# Patient Record
Sex: FEMALE | Race: WHITE
[De-identification: names, ages, dates, MRNs, and addresses within clinical notes are randomized per-mention and may not be internally consistent; named-entity substitution may affect disease eponyms.]

---

## 2020-09-15 ENCOUNTER — HOSPITAL ENCOUNTER (EMERGENCY)
Dept: HOSPITAL 47 - EC | Age: 45
Discharge: HOME | End: 2020-09-15
Payer: COMMERCIAL

## 2020-09-15 VITALS — DIASTOLIC BLOOD PRESSURE: 101 MMHG | SYSTOLIC BLOOD PRESSURE: 136 MMHG | HEART RATE: 96 BPM

## 2020-09-15 VITALS — TEMPERATURE: 98.1 F | RESPIRATION RATE: 18 BRPM

## 2020-09-15 DIAGNOSIS — Z98.890: ICD-10-CM

## 2020-09-15 DIAGNOSIS — S82.831A: Primary | ICD-10-CM
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
## 2020-09-15 DIAGNOSIS — F17.200: ICD-10-CM

## 2020-09-15 PROCEDURE — 99283 EMERGENCY DEPT VISIT LOW MDM: CPT

## 2020-09-15 PROCEDURE — 29515 APPLICATION SHORT LEG SPLINT: CPT

## 2020-09-15 NOTE — ED
Lower Extremity Injury HPI





- General


Chief Complaint: Extremity Injury, Lower


Stated Complaint: Rt ankle injury


Time Seen by Provider: 09/15/20 13:58


Source: patient, RN notes reviewed, old records reviewed


Mode of arrival: wheelchair


Limitations: no limitations





- History of Present Illness


Initial Comments: 





Patient is a 44-year-old female who presents to the ER today for evaluation for 

right ankle pain.  She reports that she went to an urgent care today because 

Continued pain.  She states that she rolled her ankle 6 weeks ago and has been 

walking on it since then.  She reports that the x-rays at urgent care determine 

she has a fracture.  They sent her here for further evaluation and splinting and

casting.  Patient states that she has been able to walk and bear weight on her 

ankle.  She reports normal sensation to the foot by an occasional sharp pains 

with light touch over the lateral malleolus.  Patient denies any knee pain





- Related Data


                                    Allergies











Allergy/AdvReac Type Severity Reaction Status Date / Time


 


No Known Allergies Allergy   Verified 09/15/20 13:56














Review of Systems


ROS Statement: 


Those systems with pertinent positive or pertinent negative responses have been 

documented in the HPI.





ROS Other: All systems not noted in ROS Statement are negative.





Past Medical History


Past Medical History: No Reported History


History of Any Multi-Drug Resistant Organisms: None Reported


Past Surgical History: Orthopedic Surgery


Past Psychological History: No Psychological Hx Reported


Smoking Status: Current every day smoker


Past Alcohol Use History: Occasional


Past Drug Use History: None Reported





General Exam





- General Exam Comments


Initial Comments: 





44-year-old female.  Alert and oriented.  No distress.


Limitations: no limitations


General appearance: alert, in no apparent distress


Head exam: Present: atraumatic, normocephalic, normal inspection


Eye exam: Present: normal appearance, PERRL, EOMI.  Absent: scleral icterus, 

conjunctival injection, periorbital swelling


ENT exam: Present: normal exam, mucous membranes moist


Neck exam: Present: normal inspection.  Absent: tenderness, meningismus, 

lymphadenopathy


Respiratory exam: Present: normal lung sounds bilaterally.  Absent: respiratory 

distress, wheezes, rales, rhonchi, stridor


Cardiovascular Exam: Present: regular rate, normal rhythm, normal heart sounds. 

Absent: systolic murmur, diastolic murmur, rubs, gallop, clicks


GI/Abdominal exam: Present: soft, normal bowel sounds.  Absent: distended, 

tenderness, guarding, rebound, rigid


Extremities exam: Present: normal inspection, full ROM, normal capillary refill.

 Absent: tenderness, pedal edema, joint swelling, calf tenderness


  ** Right


Lower Leg exam: Present: normal inspection, full ROM


Ankle exam: Present: tenderness, swelling (swelling and tenderness over the 

lateral malleolus.  Dorsalis pedis pulses intact.).  Absent: normal inspection, 

full ROM


Foot/Toe exam: Present: normal inspection, full ROM


Back exam: Present: normal inspection, full ROM


Neurological exam: Present: alert, oriented X3, CN II-XII intact


Psychiatric exam: Present: normal affect, normal mood


Skin exam: Present: warm, dry, intact, normal color.  Absent: rash





Course


                                   Vital Signs











  09/15/20





  13:53


 


Temperature 98.1 F


 


Pulse Rate 94


 


Respiratory 18





Rate 


 


Blood Pressure 138/93


 


O2 Sat by Pulse 100





Oximetry 














Procedures





- Orthopedic Splinting/Casting


  ** Injury #1


Side: right


Lower Extremity Injury Location: ankle


Lower Extremity Immobilizer: posterior splint, stirrup splint


Additional Comments: 





 is reevaluated neurovascularly intact.





Medical Decision Making





- Medical Decision Making





44-year-old female presents to the ER today for evaluation for concerns for 

outpatient x-ray showed distal fibular fracture.  She reports she rolled her 

ankle 6 weeks ago and has been walking on it.  She was sent here for splinting. 

Patient's x-ray does confirm distal fibular fracture.  Patient is 

neurovascularly intact.  Patient was placed in a posterior splint.  I spoke with

orthopedic.  Motrin and Tylenol for pain and advised to use crutches.





- Radiology Data


Radiology results: report reviewed


reviewed outpatient x-ray which is evidence of a distal fibular fracture.





Disposition


Clinical Impression: 


 Closed fracture of right distal fibula





Disposition: HOME SELF-CARE


Condition: Good


Instructions (If sedation given, give patient instructions):  Ankle Fracture 

(ED)


Additional Instructions: 


Patient has a follow-up with orthopedic specialist in the week.  Patient should 

use crutches for ambulation.  He needs toe touch for weightbearing.   Return to 

the ED if any alarming signs or symptoms occur.


Is patient prescribed a controlled substance at d/c from ED?: No


Referrals: 


None,Stated [Primary Care Provider] - 1-2 days


Jarrett Villar DO [Doctor of Osteopathic Medicine] - 1-2 days


Time of Disposition: 15:04